# Patient Record
Sex: MALE | Race: WHITE | Employment: FULL TIME | URBAN - NONMETROPOLITAN AREA
[De-identification: names, ages, dates, MRNs, and addresses within clinical notes are randomized per-mention and may not be internally consistent; named-entity substitution may affect disease eponyms.]

---

## 2017-08-01 ENCOUNTER — OFFICE VISIT (OUTPATIENT)
Dept: INTERNAL MEDICINE | Age: 42
End: 2017-08-01
Payer: COMMERCIAL

## 2017-08-01 VITALS
WEIGHT: 165 LBS | DIASTOLIC BLOOD PRESSURE: 64 MMHG | BODY MASS INDEX: 23.62 KG/M2 | SYSTOLIC BLOOD PRESSURE: 104 MMHG | HEART RATE: 93 BPM | HEIGHT: 70 IN | OXYGEN SATURATION: 96 %

## 2017-08-01 DIAGNOSIS — E55.9 VITAMIN D DEFICIENCY: ICD-10-CM

## 2017-08-01 DIAGNOSIS — R63.4 ABNORMAL WEIGHT LOSS: ICD-10-CM

## 2017-08-01 DIAGNOSIS — R11.2 INTRACTABLE VOMITING WITH NAUSEA, UNSPECIFIED VOMITING TYPE: ICD-10-CM

## 2017-08-01 DIAGNOSIS — G35 MULTIPLE SCLEROSIS (HCC): ICD-10-CM

## 2017-08-01 LAB
ALBUMIN SERPL-MCNC: 3.8 G/DL (ref 3.5–5.2)
ALP BLD-CCNC: 71 U/L (ref 40–130)
ALT SERPL-CCNC: 33 U/L (ref 5–41)
ANION GAP SERPL CALCULATED.3IONS-SCNC: 21 MMOL/L (ref 7–19)
AST SERPL-CCNC: 25 U/L (ref 5–40)
BILIRUB SERPL-MCNC: 0.7 MG/DL (ref 0.2–1.2)
BUN BLDV-MCNC: 25 MG/DL (ref 6–20)
CALCIUM SERPL-MCNC: 10.1 MG/DL (ref 8.6–10)
CHLORIDE BLD-SCNC: 96 MMOL/L (ref 98–111)
CO2: 25 MMOL/L (ref 22–29)
CREAT SERPL-MCNC: 0.7 MG/DL (ref 0.5–1.2)
GFR NON-AFRICAN AMERICAN: >60
GLUCOSE BLD-MCNC: 100 MG/DL (ref 74–109)
HCT VFR BLD CALC: 41.3 % (ref 42–52)
HEMOGLOBIN: 13.7 G/DL (ref 14–18)
MCH RBC QN AUTO: 29.7 PG (ref 27–31)
MCHC RBC AUTO-ENTMCNC: 33.2 G/DL (ref 33–37)
MCV RBC AUTO: 89.6 FL (ref 80–94)
PDW BLD-RTO: 12.1 % (ref 11.5–14.5)
PLATELET # BLD: 261 K/UL (ref 130–400)
PMV BLD AUTO: 10.7 FL (ref 9.4–12.4)
POTASSIUM SERPL-SCNC: 3.6 MMOL/L (ref 3.5–5)
RBC # BLD: 4.61 M/UL (ref 4.7–6.1)
SEDIMENTATION RATE, ERYTHROCYTE: 67 MM/HR (ref 0–10)
SODIUM BLD-SCNC: 142 MMOL/L (ref 136–145)
T4 TOTAL: 9.8 UG/ML (ref 4.5–11.7)
TOTAL PROTEIN: 8.4 G/DL (ref 6.6–8.7)
TSH SERPL DL<=0.05 MIU/L-ACNC: 3.31 UIU/ML (ref 0.27–4.2)
WBC # BLD: 9.2 K/UL (ref 4.8–10.8)

## 2017-08-01 PROCEDURE — 99214 OFFICE O/P EST MOD 30 MIN: CPT | Performed by: INTERNAL MEDICINE

## 2017-08-01 RX ORDER — DIMETHYL FUMARATE 240 MG/1
240 CAPSULE ORAL 2 TIMES DAILY
COMMUNITY

## 2017-08-01 RX ORDER — OMEGA-3 FATTY ACIDS/FISH OIL 300-1000MG
1 CAPSULE ORAL
COMMUNITY

## 2017-08-01 ASSESSMENT — ENCOUNTER SYMPTOMS
TROUBLE SWALLOWING: 0
EYE ITCHING: 0
SHORTNESS OF BREATH: 0
SORE THROAT: 0
VOMITING: 1
NAUSEA: 1
BACK PAIN: 0
WHEEZING: 0
ABDOMINAL PAIN: 0
ABDOMINAL DISTENTION: 0
BLOOD IN STOOL: 0
EYE DISCHARGE: 0

## 2017-08-01 ASSESSMENT — PATIENT HEALTH QUESTIONNAIRE - PHQ9
1. LITTLE INTEREST OR PLEASURE IN DOING THINGS: 0
SUM OF ALL RESPONSES TO PHQ9 QUESTIONS 1 & 2: 0
2. FEELING DOWN, DEPRESSED OR HOPELESS: 0
SUM OF ALL RESPONSES TO PHQ QUESTIONS 1-9: 0

## 2017-08-02 LAB
HAV IGM SER IA-ACNC: NORMAL
HEPATITIS B CORE IGM ANTIBODY: NORMAL
HEPATITIS B SURFACE ANTIGEN INTERPRETATION: NORMAL
HEPATITIS C ANTIBODY INTERPRETATION: NORMAL

## 2017-08-03 ENCOUNTER — OFFICE VISIT (OUTPATIENT)
Dept: INTERNAL MEDICINE | Age: 42
End: 2017-08-03
Payer: COMMERCIAL

## 2017-08-03 VITALS — BODY MASS INDEX: 22.1 KG/M2 | SYSTOLIC BLOOD PRESSURE: 108 MMHG | WEIGHT: 154 LBS | DIASTOLIC BLOOD PRESSURE: 64 MMHG

## 2017-08-03 DIAGNOSIS — G35 MULTIPLE SCLEROSIS (HCC): ICD-10-CM

## 2017-08-03 DIAGNOSIS — R63.4 ABNORMAL WEIGHT LOSS: ICD-10-CM

## 2017-08-03 DIAGNOSIS — R11.2 INTRACTABLE VOMITING WITH NAUSEA, UNSPECIFIED VOMITING TYPE: Primary | ICD-10-CM

## 2017-08-03 PROCEDURE — 99214 OFFICE O/P EST MOD 30 MIN: CPT | Performed by: INTERNAL MEDICINE

## 2017-09-07 ENCOUNTER — OFFICE VISIT (OUTPATIENT)
Dept: INTERNAL MEDICINE | Age: 42
End: 2017-09-07
Payer: COMMERCIAL

## 2017-09-07 VITALS
DIASTOLIC BLOOD PRESSURE: 80 MMHG | WEIGHT: 164 LBS | BODY MASS INDEX: 23.48 KG/M2 | SYSTOLIC BLOOD PRESSURE: 110 MMHG | HEIGHT: 70 IN

## 2017-09-07 DIAGNOSIS — G35 MULTIPLE SCLEROSIS (HCC): ICD-10-CM

## 2017-09-07 DIAGNOSIS — R63.4 ABNORMAL WEIGHT LOSS: Primary | ICD-10-CM

## 2017-09-07 PROCEDURE — 99213 OFFICE O/P EST LOW 20 MIN: CPT | Performed by: INTERNAL MEDICINE

## 2017-09-07 ASSESSMENT — ENCOUNTER SYMPTOMS
SORE THROAT: 0
WHEEZING: 0
TROUBLE SWALLOWING: 0
ABDOMINAL PAIN: 0
EYE ITCHING: 0
BACK PAIN: 0
VOMITING: 0
SHORTNESS OF BREATH: 0
NAUSEA: 0
ABDOMINAL DISTENTION: 0
EYE DISCHARGE: 0
BLOOD IN STOOL: 0

## 2019-04-19 ENCOUNTER — HOSPITAL ENCOUNTER (OUTPATIENT)
Dept: HOSPITAL 58 - AMBL | Age: 44
Discharge: ANOTHER HOSPITAL | End: 2019-04-19
Attending: INTERNAL MEDICINE

## 2019-04-19 ENCOUNTER — HOSPITAL ENCOUNTER (EMERGENCY)
Dept: HOSPITAL 58 - ED | Age: 44
Discharge: ANOTHER HOSPITAL | End: 2019-04-19

## 2019-04-19 VITALS — DIASTOLIC BLOOD PRESSURE: 83 MMHG | TEMPERATURE: 98.4 F | SYSTOLIC BLOOD PRESSURE: 116 MMHG

## 2019-04-19 VITALS — BODY MASS INDEX: 23.2 KG/M2

## 2019-04-19 DIAGNOSIS — N13.2: Primary | ICD-10-CM

## 2019-04-19 DIAGNOSIS — N20.0: Primary | ICD-10-CM

## 2019-04-19 DIAGNOSIS — K35.80: ICD-10-CM

## 2019-04-19 DIAGNOSIS — K37: ICD-10-CM

## 2019-04-19 PROCEDURE — 93010 ELECTROCARDIOGRAM REPORT: CPT

## 2019-04-19 PROCEDURE — 93005 ELECTROCARDIOGRAM TRACING: CPT

## 2019-04-19 PROCEDURE — 74176 CT ABD & PELVIS W/O CONTRAST: CPT

## 2019-04-19 PROCEDURE — 80053 COMPREHEN METABOLIC PANEL: CPT

## 2019-04-19 PROCEDURE — 85730 THROMBOPLASTIN TIME PARTIAL: CPT

## 2019-04-19 PROCEDURE — 96375 TX/PRO/DX INJ NEW DRUG ADDON: CPT

## 2019-04-19 PROCEDURE — 85610 PROTHROMBIN TIME: CPT

## 2019-04-19 PROCEDURE — 96365 THER/PROPH/DIAG IV INF INIT: CPT

## 2019-04-19 PROCEDURE — 96361 HYDRATE IV INFUSION ADD-ON: CPT

## 2019-04-19 PROCEDURE — 99285 EMERGENCY DEPT VISIT HI MDM: CPT

## 2019-04-19 PROCEDURE — 96372 THER/PROPH/DIAG INJ SC/IM: CPT

## 2019-04-19 PROCEDURE — 85025 COMPLETE CBC W/AUTO DIFF WBC: CPT

## 2019-04-19 PROCEDURE — 36415 COLL VENOUS BLD VENIPUNCTURE: CPT

## 2019-04-19 PROCEDURE — 81001 URINALYSIS AUTO W/SCOPE: CPT

## 2019-04-19 NOTE — ED.PDOC
General


ED Provider: 


Dr. BALJIT HIGUERA





Chief Complaint: Kidney Stone


Stated Complaint: R.L.Q /R. FLANK PAIN RADIATING TO RIGHT GROIN  ONSET 3 DAYS A 

GO


Time Seen by Physician: 07:12


Mode of Arrival: Walk-In


Information Source: Patient


Exam Limitations: No limitations


Nursing and Triage Documentation Reviewed and Agree: Yes


Does patient meet sepsis criteria?: No


System Inflammatory Response Syndrome: Not Applicable


Sepsis Protocol: 


For patient's 13 years and over:





Temp is 96.8 and below  and greater


Pulse >90 BPM


Resp >20/minute


Acutely Altered Mental Status





Are patient's symptoms suggestive of a new infection, such as:


   -Pneumonia


   -Skin, Soft Tissue


   -Endocarditis


   -UTI


   -Bone, Joint Infection


   -Implantable Device


   -Acute Abdominal Infection


   -Wound Infection


   -Meningitis


   -Blood Stream Catheter Infection


   -Unknown








GI Complaint Exam





- Abdominal Pain Complaint/Exam


Onset: Sudden (ONSET 3 DAYS AGO  WAS UNABLE TO VOID FOR HOURS THAT PROBLEM DID 

RESOLVE BUT HAS DYSURIA , AND AND ABDOMINAL  PAIN ON THE RIGHT SIDE)


Duration: 3 DAYS 


Symptoms Are: Still present (INABILITY TO VOID HAS  RESOLVED)


Timing: Constant


Initial Severity: Moderate


Current Severity: Moderate


Location of Pain: RLQ (/F;ANL)


Radiates To: Reports: Flank (RIGHT )


Character: Reports: Aching


Aggravating: Reports: None (LAST MEAL1 DAY AGO , LAST B.M. 3 DAYS AGO NEGATIVE 

BLACK OR BLOODY STOOLS )


Associated Signs and Symptoms: Reports: Back pain (RIGHT), Dysuria, Decreased 

appetite, Nausea.  Denies: Diaphoresis, Fever, Cough, Chest pain, Dizziness, 

Constipation, Blood in stool, Urinary frequency, Decreased urine output, 

Discharge, Vomiting, Diarrhea, Decreased activity


AAA Risk Factors: Reports: None


Cardiac Risk Factors: Reports: None


Testicular Torsion Risk Factors: Reports: None


Related Surgical History: Reports: None


Abdominal Findings: Present: CVA Tenderness (RIGHT), Other (SOME PAIN 3/10 

RIGHT LOWER ABDOMEN . ).  Absent: Pulsatile mass, Abdominal distention, Unequal 

femoral pulses, Rebound tenderness, Hernia, Inguinal swelling


Differential Diagnoses: Appendicitis, Renal Colic, UTI





Review of Systems





- Review Of Systems


Constitutional: Reports: No symptoms


Eyes: Reports: No symptoms


Ears, Nose, Mouth, Throat: Reports: No symptoms


Respiratory: Reports: No symptoms


Cardiac: Reports: No symptoms


GI: Reports: Abdominal pain


: Reports: Dysuria


Musculoskeletal: Reports: Back pain (RIGHT)


Skin: Reports: No symptoms


Neurological: Reports: No symptoms


Endocrine: Reports: No symptoms


Hematologic/Lymphatic: Reports: No symptoms


All Other Systems: Reviewed and Negative





Past Medical History





- Past Medical History


Previously Healthy: Yes


Endocrine: Reports: None


Cardiovascular: Reports: None


Respiratory: Reports: None


Hematological: Reports: None


Gastrointestinal: Reports: None


Genitourinary: Reports: None


Neuro/Psych: Reports: None


Musculoskeletal: Reports: None


Cancer: Reports: None





- Surgical History


General Surgical History: Reports: Unknown





- Family History


Family History: Reports: Unknown





- Social History


Smoking Status: Never smoker


Hx Substance Use: No


Alcohol Screening: None





Physical Exam





- Physical Exam


Appearance: Well-appearing, No pain distress, Well-nourished


Eyes: JEFFY, EOMI, Conjunctiva clear


ENT: Ears normal, Nose normal, Oropharynx normal


Respiratory: Airway patent, Breath sounds clear, Breath sounds equal, 

Respirations nonlabored


Cardiovascular: RRR, Pulses normal, No rub, No murmur


GI/: Tender (RLQ )


Musculoskeletal: ROM intact (BUT RIGHT FLANK PAIN)


Skin: Warm, Dry, Normal color


Neurological: Sensation intact, Motor intact, Reflexes intact, Cranial nerves 

intact, Alert, Oriented


Psychiatric: Affect appropriate, Mood appropriate





Physician Notification





- Case Discussed


Physician Notified: DONELL SAAVEDRA/ [facility] SURGERY


Time of Notification: 08:40 (TRANSFER NOW SEND DISCS, KEEP N.P.O  HAVE PT GO TO 

SAME DAY SURGERY)





Critical Care Note





- Critical Care Note


Total Time (mins): 0





Course





- Course


Hematology/Chemistry: 


 04/19/19 07:32





Orders, Labs, Meds: 





Lab Review











  04/19/19 04/19/19





  07:32 07:50


 


WBC  10.78 H 


 


RBC  4.82 


 


Hgb  14.2 


 


Hct  41.8 L 


 


MCV  86.7 


 


MCH  29.5 


 


MCHC  34.0 


 


RDW Coeff of Travis  12.2 


 


Plt Count  219 


 


Immature Gran % (Auto)  0.3 


 


Neut % (Auto)  85.1 


 


Lymph % (Auto)  6.2 L 


 


Mono % (Auto)  7.8 


 


Eos % (Auto)  0.4 


 


Baso % (Auto)  0.2 


 


Immature Gran # (Auto)  0.0 


 


Neut # (Auto)  9.2 H 


 


Lymph # (Auto)  0.7 


 


Mono # (Auto)  0.8 


 


Eos # (Auto)  0.0 


 


Baso # (Auto)  0.0 


 


Urine Color   Yellow


 


Urine Clarity   Clear


 


Urine pH   5.5


 


Ur Specific Gravity   1.025


 


Urine Protein   1+


 


Urine Glucose (UA)   Negative


 


Urine Ketones   4+


 


Urine Blood   Trace-lysed


 


Urine Nitrite   Negative


 


Urine Bilirubin   1+


 


Urine Urobilinogen   0.2


 


Ur Leukocyte Esterase   Negative


 


Urine Microscopic RBC   2-5


 


Urine Microscopic WBC   0-2


 


Ur Squamous Epith Cells   Not present


 


Urine Mucus   Trace








Orders











 Category Date Time Status


 


 CBC W/ AUTO DIFF Stat LAB  04/19/19 07:24 Ordered


 


 COMPREHENSIVE METABOLIC PANEL Stat LAB  04/19/19 07:24 Ordered


 


 URINALYSIS C & S IF INDICATED Stat LAB  04/19/19 07:24 Uncollected


 


 Ketorolac Tromethamine [Toradol] MEDS  04/19/19 07:24 Stat





 60 mg IM ONCE STA   


 


 CT ABD/PEL WO RENAL STONE PROT Stat RADS  04/19/19 07:24 Ordered








Medications














Discontinued Medications














Generic Name Dose Route Start Last Admin





  Trade Name Freq  PRN Reason Stop Dose Admin


 


Ketorolac Tromethamine  60 mg  04/19/19 07:24  04/19/19 07:53





  Toradol  IM  04/19/19 07:25  60 mg





  ONCE STA   Administration





     





     





     





     











Vital Signs: 





 











  Temp Pulse Resp BP Pulse Ox


 


 04/19/19 07:11  98.4 F  99 H  20  116/83  97














Departure





- Departure


Time of Disposition: 08:40


Disposition: HOME SELF-CARE


Discharge Problem: 


 Hydronephrosis with renal and ureteral calculous obstruction, Kidney stone





Appendicitis


Qualifiers:


 Appendicitis type: unspecified Qualified Code(s): K37 - Unspecified 

appendicitis





Instructions:  Acute Abdominal Pain (DC)


Condition: Good


Pt referred to PMD for follow-up: Yes


IPMP verified?: No


Additional Instructions: 


Please call your Family Physician as soon as possible to schedule a follow-up 

appointment.


Allergies/Adverse Reactions: 


Allergies





No Known Allergies Allergy (Verified 10/07/15 22:02)


 








Home Medications: 


Ambulatory Orders





Dimethyl Fumarate [Tecfidera] 240 mg PO BID 10/07/15

## 2019-04-19 NOTE — CT
EXAM:  CT abdomen pelvis without contrast 

  

HISTORY:  Pain 

  

COMPARISON:  None 

  

TECHNIQUE:  CT abdomen pelvis performed without intravenous contrast.  Coronal and sagittal reformatt
ed images obtained. 

  

FINDINGS:  Lung bases clear.  No free air.  No acute abnormalities of the bones.  Mild degenerative c
hange in the spine.  Heart top normal in size.  Evaluation organ parenchyma limited without contrast.
  Liver unremarkable.  Gallbladder unremarkable.  Pancreas unremarkable.  Granulomatous calcification
 in the spleen.  Spleen otherwise unremarkable.  Adrenals unremarkable.  The aorta normal in caliber.
  Prostate normal in size.  Stomach unremarkable.  No dilated loops small bowel.  Colon unremarkable.
  The appendix is dilated at 1.1 cm. Several surrounding sub centimeter lymph nodes.  Inflammation in
 the surrounding mesentery, though not directly centered about the appendix.  Mild right hydrouretero
nephrosis secondary to a 4 mm obstructing calculus at the right ureterovesicular junction.  Associate
d right perinephric/periureteral stranding.  No nephrolithiasis.  No calculi visualized in normal cou
rse of the left ureter. 

  

IMPRESSION: 

1.  Mild right hydroureternephrosis and associated right perinephric/periureteral stranding, secondar
y to a 4 mm obstructing calculus at the right ureterovesicular junction. 

2.  Abnormal appearance of the appendix which is dilated up to 1.1 cm. Inflammation in the surroundin
g mesentery, though not directly centered about the appendix.  Several small adjacent sub centimeter 
lymph nodes.  Differential diagnosis includes acute appendicitis, appendix mucocele, with appendiceal
 neoplasm not excluded 

  

Findings discussed with Dr. Koroma 8:25 a.m. 04/19/2019.